# Patient Record
Sex: FEMALE | Race: WHITE | NOT HISPANIC OR LATINO | Employment: OTHER | URBAN - METROPOLITAN AREA
[De-identification: names, ages, dates, MRNs, and addresses within clinical notes are randomized per-mention and may not be internally consistent; named-entity substitution may affect disease eponyms.]

---

## 2024-09-06 ENCOUNTER — OFFICE VISIT (OUTPATIENT)
Dept: OBSTETRICS AND GYNECOLOGY | Facility: CLINIC | Age: 59
End: 2024-09-06
Payer: COMMERCIAL

## 2024-09-06 VITALS
BODY MASS INDEX: 22.5 KG/M2 | DIASTOLIC BLOOD PRESSURE: 82 MMHG | SYSTOLIC BLOOD PRESSURE: 130 MMHG | WEIGHT: 140 LBS | HEIGHT: 66 IN

## 2024-09-06 DIAGNOSIS — Z01.419 PAP SMEAR, AS PART OF ROUTINE GYNECOLOGICAL EXAMINATION: Primary | ICD-10-CM

## 2024-09-06 DIAGNOSIS — Z11.51 SPECIAL SCREENING EXAMINATION FOR HUMAN PAPILLOMAVIRUS (HPV): ICD-10-CM

## 2024-09-06 DIAGNOSIS — Z01.419 ROUTINE GYNECOLOGICAL EXAMINATION: ICD-10-CM

## 2024-09-06 DIAGNOSIS — N81.10 CYSTOCELE WITH RECTOCELE: ICD-10-CM

## 2024-09-06 DIAGNOSIS — Z13.89 SCREENING FOR GENITOURINARY CONDITION: ICD-10-CM

## 2024-09-06 DIAGNOSIS — N81.6 CYSTOCELE WITH RECTOCELE: ICD-10-CM

## 2024-09-06 LAB
BILIRUB BLD-MCNC: NEGATIVE MG/DL
CLARITY, POC: CLEAR
COLOR UR: YELLOW
GLUCOSE UR STRIP-MCNC: NEGATIVE MG/DL
KETONES UR QL: NEGATIVE
LEUKOCYTE EST, POC: NEGATIVE
NITRITE UR-MCNC: NEGATIVE MG/ML
PH UR: 5 [PH] (ref 5–8)
PROT UR STRIP-MCNC: NEGATIVE MG/DL
RBC # UR STRIP: NEGATIVE /UL
SP GR UR: 1.03 (ref 1–1.03)
UROBILINOGEN UR QL: NORMAL

## 2024-09-06 NOTE — PROGRESS NOTES
GYN Annual Exam     CC- Here for annual exam.     Azul Lynch is a 58 y.o. female new patient who presents for annual well woman exam.     Also noted pelvic pressure and felt something coming out. Able to void. Wanted an assessment today       OB History    No obstetric history on file.         Menarche:12-14yo   Menopause:6 years ago  HRT:Denies   Current contraception: abstinence and post menopausal status  History of abnormal Pap smear: no   History of abnormal mammogram: no  Family history of uterine, colon or ovarian cancer: no  Family history of breast cancer: no  STD's: Denies  Last pap smear:~ 5 years ago   Gardasil: No   HUSSEIN: none      Health Maintenance   Topic Date Due    Annual Gynecologic Pelvic and Breast Exam  Never done    TDAP/TD VACCINES (1 - Tdap) Never done    ZOSTER VACCINE (2 of 2) 10/05/2022    COVID-19 Vaccine (2 - 2023-24 season) 09/01/2024    HEPATITIS C SCREENING  Never done    ANNUAL PHYSICAL  Never done    PAP SMEAR  Never done    INFLUENZA VACCINE  08/01/2024    MAMMOGRAM  08/21/2026    COLORECTAL CANCER SCREENING  02/08/2028    Pneumococcal Vaccine 0-64  Aged Out       Past Medical History:   Diagnosis Date    Pelvic prolapse     The is the reason for my visit. I believe I have one now.    PMS (premenstrual syndrome)     Not severe but did have when menstruating    Urinary tract infection     Not currently but have had one in the past.    Varicella     As a child. Unsure of date       Past Surgical History:   Procedure Laterality Date    APPENDECTOMY  5/1982       No current outpatient medications on file.    No Known Allergies    Social History     Tobacco Use    Smoking status: Never    Tobacco comments:     None   Substance Use Topics    Alcohol use: Not Currently     Comment: Rarely drink and then only one    Drug use: Never       Family History   Problem Relation Age of Onset    Hypertension Mother     Diabetes Father         Diagnosed 1978       Review of Systems  See HPI  "    /82   Ht 167.6 cm (66\")   Wt 63.5 kg (140 lb)   BMI 22.60 kg/m²     Physical Exam  Exam conducted with a chaperone present.   Constitutional:       Appearance: Normal appearance.   Cardiovascular:      Rate and Rhythm: Normal rate and regular rhythm.   Pulmonary:      Effort: Pulmonary effort is normal.      Breath sounds: Normal breath sounds.   Abdominal:      General: Abdomen is flat.      Palpations: Abdomen is soft.   Genitourinary:     General: Normal vulva.      Exam position: Lithotomy position.      Vagina: Normal.      Cervix: Normal.      Uterus: Normal.       Adnexa:         Right: No mass or tenderness.          Left: No mass or tenderness.        Comments: Noted anterior and posterior prolapse with valsalva  No uterine prolapse noted   Skin:     General: Skin is warm and dry.   Neurological:      General: No focal deficit present.      Mental Status: She is oriented to person, place, and time.   Psychiatric:         Mood and Affect: Mood normal.         Behavior: Behavior normal.              Assessment/Plan    1) GYN HM: pap/HPV  SBE demonstrated and encouraged.  2) STD screening: declines Condoms encouraged.  3) Bone health - Weight bearing exercise, dietary calcium recommendations and vitamin D reviewed.   4) Diet and Exercise discussed  5) Smoking Status: No  6) Social: ; not sexually active  7)MMG: UTD Aug 2024 Borads I  8) DEXA-due, will schedule  9)C scope-UTD Next scheduled per pt 63yo    Follow up prn and 1 year       Diagnoses and all orders for this visit:    1. Pap smear, as part of routine gynecological examination (Primary)  -     IGP, Apt HPV,rfx 16 / 18,45    2. Screening for genitourinary condition  -     POC Urinalysis Dipstick    3. Routine gynecological examination  -     IGP, Apt HPV,rfx 16 / 18,45  -     DEXA Bone Density Axial; Future    4. Special screening examination for human papillomavirus (HPV)  -     IGP, Apt HPV,rfx 16 / 18,45    5. Cystocele with " rectocele    I spent 20 minutes on the separately reported service of Cystocele with rectocele:  AUGS handouts given. Not interested in surgery at this time. Continue with pelvic exercises; reassurance given   . This time is not included in the time used to support the E/M service also reported today.    Azul was a pleasure to meet; can see me anytime       Jonathon Oneal DO  09/06/2024    11:03 EDT

## 2024-09-10 LAB
CYTOLOGIST CVX/VAG CYTO: NORMAL
CYTOLOGY CVX/VAG DOC CYTO: NORMAL
CYTOLOGY CVX/VAG DOC THIN PREP: NORMAL
DX ICD CODE: NORMAL
HPV I/H RISK 4 DNA CVX QL PROBE+SIG AMP: NEGATIVE
Lab: NORMAL
OTHER STN SPEC: NORMAL
STAT OF ADQ CVX/VAG CYTO-IMP: NORMAL

## 2024-10-15 ENCOUNTER — HOSPITAL ENCOUNTER (OUTPATIENT)
Dept: BONE DENSITY | Facility: HOSPITAL | Age: 59
Discharge: HOME | End: 2024-10-15
Admitting: STUDENT IN AN ORGANIZED HEALTH CARE EDUCATION/TRAINING PROGRAM
Payer: COMMERCIAL

## 2024-10-15 DIAGNOSIS — Z01.419 ROUTINE GYNECOLOGICAL EXAMINATION: ICD-10-CM

## 2024-10-15 PROCEDURE — 77080 DXA BONE DENSITY AXIAL: CPT
